# Patient Record
Sex: FEMALE | Race: WHITE | NOT HISPANIC OR LATINO | URBAN - METROPOLITAN AREA
[De-identification: names, ages, dates, MRNs, and addresses within clinical notes are randomized per-mention and may not be internally consistent; named-entity substitution may affect disease eponyms.]

---

## 2018-01-25 ENCOUNTER — OTHER (OUTPATIENT)
Dept: URBAN - METROPOLITAN AREA CLINIC 19 | Facility: CLINIC | Age: 63
Setting detail: DERMATOLOGY
End: 2018-01-25

## 2018-01-25 PROCEDURE — OTHER VBEAM LASER: OTHER

## 2018-02-22 ENCOUNTER — LASER (OUTPATIENT)
Dept: URBAN - METROPOLITAN AREA CLINIC 19 | Facility: CLINIC | Age: 63
Setting detail: DERMATOLOGY
End: 2018-02-22

## 2018-02-22 DIAGNOSIS — L70.0 ACNE VULGARIS: ICD-10-CM

## 2018-02-22 PROCEDURE — OTHER VBEAM LASER: OTHER

## 2018-02-22 PROCEDURE — OTHER SCLEROTHERAPY: OTHER

## 2018-03-22 ENCOUNTER — LASER (OUTPATIENT)
Dept: URBAN - METROPOLITAN AREA CLINIC 19 | Facility: CLINIC | Age: 63
Setting detail: DERMATOLOGY
End: 2018-03-22

## 2018-03-22 DIAGNOSIS — C44.612 BASAL CELL CARCINOMA OF SKIN OF RIGHT UPPER LIMB, INCLUDING SHOULDER: ICD-10-CM

## 2018-03-22 PROCEDURE — OTHER NO CHARGE PER PROVIDER: OTHER

## 2018-05-17 ENCOUNTER — LASER (OUTPATIENT)
Dept: URBAN - METROPOLITAN AREA CLINIC 19 | Facility: CLINIC | Age: 63
Setting detail: DERMATOLOGY
End: 2018-05-17

## 2018-05-17 DIAGNOSIS — B35.1 TINEA UNGUIUM: ICD-10-CM

## 2018-05-17 DIAGNOSIS — L30.9 DERMATITIS, UNSPECIFIED: ICD-10-CM

## 2018-05-17 PROCEDURE — OTHER C02RE LASER: OTHER

## 2019-04-03 ENCOUNTER — LASER (OUTPATIENT)
Dept: URBAN - METROPOLITAN AREA CLINIC 19 | Facility: CLINIC | Age: 64
Setting detail: DERMATOLOGY
End: 2019-04-03

## 2019-04-03 DIAGNOSIS — L82.1 OTHER SEBORRHEIC KERATOSIS: ICD-10-CM

## 2019-04-03 DIAGNOSIS — Z85.828 PERSONAL HISTORY OF OTHER MALIGNANT NEOPLASM OF SKIN: ICD-10-CM

## 2019-11-08 ENCOUNTER — OTHER (OUTPATIENT)
Dept: URBAN - METROPOLITAN AREA CLINIC 19 | Facility: CLINIC | Age: 64
Setting detail: DERMATOLOGY
End: 2019-11-08

## 2020-09-04 RX ORDER — PREDNISONE 10 MG/1
10 MG TABLET ORAL AS DIRECTED
Qty: 35 | Refills: 0
Start: 2020-09-04

## 2020-09-04 RX ORDER — DOXYCYCLINE HYCLATE 200 MG/1
1/2 TABLET TABLET, DELAYED RELEASE ORAL ONCE A DAY
Qty: 1 | Refills: 0
Start: 2020-09-04

## 2020-11-16 ENCOUNTER — COMPLETE SKIN EXAM (OUTPATIENT)
Dept: URBAN - METROPOLITAN AREA CLINIC 19 | Facility: CLINIC | Age: 65
Setting detail: DERMATOLOGY
End: 2020-11-16

## 2020-11-16 DIAGNOSIS — D48.5 NEOPLASM OF UNCERTAIN BEHAVIOR OF SKIN: ICD-10-CM

## 2020-11-16 PROBLEM — L82.1 OTHER SEBORRHEIC KERATOSIS: Status: RESOLVED | Noted: 2020-11-16

## 2020-11-16 PROBLEM — L82.0 INFLAMED SEBORRHEIC KERATOSIS: Status: RESOLVED | Noted: 2020-11-16

## 2020-11-16 PROBLEM — D18.01 HEMANGIOMA OF SKIN AND SUBCUTANEOUS TISSUE: Status: RESOLVED | Noted: 2020-11-16

## 2020-11-16 PROCEDURE — 11102 TANGNTL BX SKIN SINGLE LES: CPT

## 2020-11-16 PROCEDURE — 99213 OFFICE O/P EST LOW 20 MIN: CPT

## 2020-11-16 PROCEDURE — 17110 DESTRUCT B9 LESION 1-14: CPT

## 2020-11-17 ENCOUNTER — LASER (OUTPATIENT)
Dept: URBAN - METROPOLITAN AREA CLINIC 19 | Facility: CLINIC | Age: 65
Setting detail: DERMATOLOGY
End: 2020-11-17

## 2020-11-17 DIAGNOSIS — L57.0 ACTINIC KERATOSIS: ICD-10-CM

## 2020-11-17 PROCEDURE — OTHER GENTLEMAX LASER: OTHER

## 2021-05-21 ENCOUNTER — OFFICE (OUTPATIENT)
Dept: URBAN - METROPOLITAN AREA CLINIC 72 | Facility: CLINIC | Age: 66
End: 2021-05-21
Payer: OTHER GOVERNMENT

## 2021-05-21 VITALS
HEIGHT: 63 IN | HEART RATE: 85 BPM | WEIGHT: 118 LBS | DIASTOLIC BLOOD PRESSURE: 83 MMHG | SYSTOLIC BLOOD PRESSURE: 119 MMHG

## 2021-05-21 DIAGNOSIS — K22.70 BARRETT'S ESOPHAGUS WITHOUT DYSPLASIA: ICD-10-CM

## 2021-05-21 DIAGNOSIS — D49.0 NEOPLASM OF UNSPECIFIED BEHAVIOR OF DIGESTIVE SYSTEM: ICD-10-CM

## 2021-05-21 DIAGNOSIS — Z12.11 ENCOUNTER FOR SCREENING FOR MALIGNANT NEOPLASM OF COLON: ICD-10-CM

## 2021-05-21 DIAGNOSIS — K21.9 GASTRO-ESOPHAGEAL REFLUX DISEASE WITHOUT ESOPHAGITIS: ICD-10-CM

## 2021-05-21 PROCEDURE — 99213 OFFICE O/P EST LOW 20 MIN: CPT | Performed by: INTERNAL MEDICINE

## 2021-05-21 RX ORDER — FAMOTIDINE 40 MG/1
TABLET, FILM COATED ORAL
Qty: 90 | Refills: 2 | Status: ACTIVE

## 2021-05-21 RX ORDER — OMEPRAZOLE 20 MG/1
CAPSULE, DELAYED RELEASE ORAL
Qty: 30 | Refills: 10 | Status: ACTIVE

## 2021-05-21 NOTE — SERVICENOTES
Our goal is to partner with you to improve your health and well being. It is important for you to complete necessary testing and follow the instructions given to you at your clinic visit. Our office will call you within 2 weeks with results of any testing but you may also call sooner to obtain results [phone].   If you have any questions or concerns please feel free to call.  We take your care very seriously and we thank you for your trust!
- famotidine at night before bed or after dinner as needed for breakthrough
- you can also use Gaviscon advanced for breakthrough
- EGD in 5/2022.  WE will wait list you
- follow up as needed.

## 2021-05-21 NOTE — SERVICEHPINOTES
Kenia Pandey   is seen today for a follow-up visit.     65 year old with a history of breast cancer, osteopenia, history of partial papncreatectomy for pancreatic cysts (ho IPMN status post modified whipple in 2012).  She as previously seen by Dr. Johnson.  she had possible barretts on EGD 7/2017 and is due to repeat in 1 year.  She is taking omeprazole daily.  She was started on omeprazole by Dr. Johnson because she had occasional reflux symptoms.  Symptoms occur if she eats too  much late at night or if she bends over.  They are intermittent.  She is not sure if the omeprazole has really helped.  SHe is taking calcium.  She has osteopenia but is on medication and bones are back to normal.  No dysphagia.  NO weight loss.BRInterval history 11/2019BRShe is feeling good.BRShe takes her omeprazole once a day. That pretty much helps her symptoms .  She has some occasional regurgitation.BRNo side effects from the medication.  She is taking the 20 mg dose of the medication.  BRBowel movements are OK, no other symptoms.  She takes fiber every gone.    Plan from last visit:BR- will repeat EGD around 7/2021BR- continue omeprazole 20 mg once daily 30 min before breakfastBR- Follow up as needed if symptoms are not improving or if you are having new symptomsInterval History:  5/21/2021  she is due for the EGDBRshe needs refill on the omeprazoleBREver since she has had her whipple she has an intermittent tightening in her upper abdomen.  This can occur about every couple months.  It feels like a tightening of the abdominal wall muscles and if she takes deeps breaths it will go away.  It may last 2-3 minutes. On the 20 of omeprazole sometimes will have night time symptoms when laying.  My nurse has reviewed and updated the medication list with the patient (medication reconciliation). I have also reviewed the medication list. New updates were made to the patient's medical, social and family history. Pertinent details are also noted above in the HPI.BR

## 2021-11-11 ENCOUNTER — LASER (OUTPATIENT)
Dept: URBAN - METROPOLITAN AREA CLINIC 19 | Facility: CLINIC | Age: 66
Setting detail: DERMATOLOGY
End: 2021-11-11

## 2021-11-11 DIAGNOSIS — D18.01 HEMANGIOMA OF SKIN AND SUBCUTANEOUS TISSUE: ICD-10-CM

## 2021-11-11 DIAGNOSIS — Z85.828 PERSONAL HISTORY OF OTHER MALIGNANT NEOPLASM OF SKIN: ICD-10-CM

## 2021-11-11 DIAGNOSIS — L90.5 SCAR CONDITIONS AND FIBROSIS OF SKIN: ICD-10-CM

## 2021-11-11 DIAGNOSIS — L57.8 OTHER SKIN CHANGES DUE TO CHRONIC EXPOSURE TO NONIONIZING RADIATION: ICD-10-CM

## 2021-11-11 DIAGNOSIS — L82.1 OTHER SEBORRHEIC KERATOSIS: ICD-10-CM

## 2021-11-11 DIAGNOSIS — L81.4 OTHER MELANIN HYPERPIGMENTATION: ICD-10-CM

## 2021-11-11 PROCEDURE — OTHER VBEAM LASER: OTHER

## 2021-11-11 PROCEDURE — OTHER HMG - COSMETIC PROCEDURE: OTHER

## 2022-05-23 ENCOUNTER — OFFICE (OUTPATIENT)
Dept: URBAN - METROPOLITAN AREA CLINIC 72 | Facility: CLINIC | Age: 67
End: 2022-05-23
Payer: OTHER GOVERNMENT

## 2022-05-23 VITALS
HEART RATE: 80 BPM | HEIGHT: 63 IN | DIASTOLIC BLOOD PRESSURE: 80 MMHG | SYSTOLIC BLOOD PRESSURE: 138 MMHG | WEIGHT: 112 LBS | OXYGEN SATURATION: 99 %

## 2022-05-23 DIAGNOSIS — K22.70 BARRETT'S ESOPHAGUS WITHOUT DYSPLASIA: ICD-10-CM

## 2022-05-23 DIAGNOSIS — M62.838 OTHER MUSCLE SPASM: ICD-10-CM

## 2022-05-23 DIAGNOSIS — K21.9 GASTRO-ESOPHAGEAL REFLUX DISEASE WITHOUT ESOPHAGITIS: ICD-10-CM

## 2022-05-23 PROCEDURE — 99214 OFFICE O/P EST MOD 30 MIN: CPT | Performed by: INTERNAL MEDICINE

## 2022-05-23 NOTE — SERVICEHPINOTES
Kenia Pandey   is seen today for a follow-up visit.  
br
br66 year old with a history of breast cancer, osteopenia, history of partial papncreatectomy for pancreatic cysts (ho IPMN status post modified whipple in 2012). She as previously seen by Dr. Johnson. she had possible barretts on EGD 7/2017 and is due to repeat in 1 year. She is taking omeprazole daily. She was started on omeprazole by Dr. Johnson because she had occasional reflux symptoms. Symptoms occur if she eats too much late at night or if she bends over. They are intermittent. She is not sure if the omeprazole has really helped. SHe is taking calcium. She has osteopenia but is on medication and bones are back to normal. No dysphagia. NO weight loss.brInterval history 11/2019brJayne is feeling good.Franciscohe takes her omeprazole once a day. That pretty much helps her symptoms. She has some occasional regurgitation.brNo side effects from the medication. She is taking the 20 mg dose of the medication. brBowel movements are OK, no other symptoms. She takes fiber every gone. Interval History:5/21/2021she is due for the EGDbrshe needs refill on the omeprazolebrEver since she has had her whipple she has an intermittent tightening in her upper abdomen. This can occur about every couple months. It feels like a tightening of the abdominal wall muscles and if she takes deeps breaths it will go away. It may last 2-3 minutes.On the 20 of omeprazole sometimes will have night time symptoms when laying.    br  Plan from last visit:
br
br- famotidine at night before bed or after dinner as needed for breakthroughbr- you can also use Gaviscon advanced for breakthroughbr- EGD in 5/2022. WE will wait list youbr- follow up as needed. Interval History:  5/23/2022   
br   she is due for EGD now
br She still has a muscle spasm in her upper abdomen inly lasting for 15 seconds to a ocuple minutesMy nurse has reviewed and updated the medication list with the patient (medication reconciliation). I have also reviewed the medication list. New updates were made to the patient's medical, social and family history. Pertinent details are also noted above in the HPI.br visited="true"

## 2022-05-23 NOTE — SERVICENOTES
Our goal is to partner with you to improve your health and well being. It is important for you to complete necessary testing and follow the instructions given to you at your clinic visit. Our office will call you within 2 weeks with results of any testing but you may also call sooner to obtain results [phone].   If you have any questions or concerns please feel free to call.  We take your care very seriously and we thank you for your trust!
- , start hyoscyamine/levsin 1 pill sublingual up to every 8 hours as needed for abdominal cramping or pain.  If this doesn't help then stop
- schedule EGD

## 2022-06-22 ENCOUNTER — APPOINTMENT (OUTPATIENT)
Dept: URBAN - METROPOLITAN AREA SURGERY 12 | Age: 67
Setting detail: DERMATOLOGY
End: 2022-06-22

## 2022-06-22 DIAGNOSIS — Z41.9 ENCOUNTER FOR PROCEDURE FOR PURPOSES OTHER THAN REMEDYING HEALTH STATE, UNSPECIFIED: ICD-10-CM

## 2022-06-22 PROCEDURE — OTHER LASER BROWN SPOTS: OTHER

## 2022-06-22 PROCEDURE — OTHER VBEAM PERFECTA LASER: OTHER

## 2022-06-22 ASSESSMENT — LOCATION SIMPLE DESCRIPTION DERM: LOCATION SIMPLE: LEFT CHEEK

## 2022-06-22 ASSESSMENT — LOCATION DETAILED DESCRIPTION DERM: LOCATION DETAILED: LEFT INFERIOR CENTRAL MALAR CHEEK

## 2022-06-22 ASSESSMENT — LOCATION ZONE DERM: LOCATION ZONE: FACE

## 2022-06-22 NOTE — PROCEDURE: LASER BROWN SPOTS
Post-Care Instructions: I reviewed with the patient in detail post-care instructions. Patient is to apply vaseline as needed to all crusted areas, and avoid picking at any scabs. Pt should stay away from the sun and wear sun protection until fully healed.

## 2022-06-22 NOTE — PROCEDURE: VBEAM PERFECTA LASER
Pulse Duration: 10 ms
Pulse Duration: 40 ms
Is There A Third Setting?: yes
Spray Time (Ms): 40
Number Of Pulses: 309
Post-Procedure: Following the procedure the post care instructions were reviewed with the patient.
Number Of Pulses: 31
Fluence (J/Cm2): 7.25
Spot Size: 3 mm
Spot Size: 7 mm
Spray Time (Ms): 0
Delay Time (Ms): 30
Price (Use Numbers Only, No Special Characters Or $): 100.00
Skin Type (Optional): II
Pulse Duration: 1.5 ms
Fluence (J/Cm2): 5.5
Spot Size: 12 mm
Consent: Verbal consent obtained.  Risks were reviewed including but not limited to crusting, scabbing, blistering, scarring, darker or lighter pigmentary change, bruising, and/or incomplete response.
Treatment Number: 1
Is There A Fourth Setting?: no
Fluence (J/Cm2): 11.5
Spot Size: 3x10 mm
Post-Care Instructions: I reviewed with the patient in detail post-care instructions.  Cool compresses or cold gel packs may be applied after treatment.  Exposure to the sun should be avoided and sun protection and sunscreen should be used.
Delay Time (Ms): 20
Location: face
Number Of Pulses: 266
Pre-Procedure: The treatment areas identified by the patient were cleansed and treatment was performed with the parameters mentioned above.
Detail Level: Zone

## 2022-07-18 ENCOUNTER — OFFICE (OUTPATIENT)
Dept: URBAN - METROPOLITAN AREA PATHOLOGY 24 | Facility: PATHOLOGY | Age: 67
End: 2022-07-18
Payer: OTHER GOVERNMENT

## 2022-07-18 ENCOUNTER — AMBULATORY SURGICAL CENTER (OUTPATIENT)
Dept: URBAN - METROPOLITAN AREA SURGERY 19 | Facility: SURGERY | Age: 67
End: 2022-07-18
Payer: OTHER GOVERNMENT

## 2022-07-18 DIAGNOSIS — K22.89 OTHER SPECIFIED DISEASE OF ESOPHAGUS: ICD-10-CM

## 2022-07-18 DIAGNOSIS — K22.2 ESOPHAGEAL OBSTRUCTION: ICD-10-CM

## 2022-07-18 LAB
RELEVANT H&P ENDOSCOPY: (no result)
RELEVANT H&P ENDOSCOPY: (no result)

## 2022-07-18 PROCEDURE — 88305 TISSUE EXAM BY PATHOLOGIST: CPT | Performed by: STUDENT IN AN ORGANIZED HEALTH CARE EDUCATION/TRAINING PROGRAM

## 2022-07-18 PROCEDURE — 88313 SPECIAL STAINS GROUP 2: CPT | Performed by: STUDENT IN AN ORGANIZED HEALTH CARE EDUCATION/TRAINING PROGRAM

## 2022-07-18 PROCEDURE — 43239 EGD BIOPSY SINGLE/MULTIPLE: CPT | Performed by: INTERNAL MEDICINE

## 2023-09-05 ENCOUNTER — OFFICE (OUTPATIENT)
Dept: URBAN - METROPOLITAN AREA CLINIC 72 | Facility: CLINIC | Age: 68
End: 2023-09-05
Payer: OTHER GOVERNMENT

## 2023-09-05 VITALS
OXYGEN SATURATION: 94 % | SYSTOLIC BLOOD PRESSURE: 112 MMHG | WEIGHT: 125 LBS | DIASTOLIC BLOOD PRESSURE: 70 MMHG | HEART RATE: 75 BPM | HEIGHT: 63 IN

## 2023-09-05 DIAGNOSIS — Z90.410 ACQUIRED TOTAL ABSENCE OF PANCREAS: ICD-10-CM

## 2023-09-05 DIAGNOSIS — K21.9 GASTRO-ESOPHAGEAL REFLUX DISEASE WITHOUT ESOPHAGITIS: ICD-10-CM

## 2023-09-05 DIAGNOSIS — Z79.899 OTHER LONG TERM (CURRENT) DRUG THERAPY: ICD-10-CM

## 2023-09-05 PROCEDURE — 99213 OFFICE O/P EST LOW 20 MIN: CPT | Performed by: INTERNAL MEDICINE

## 2023-09-05 NOTE — SERVICENOTES
Our goal is to partner with you to improve your health and well being. It is important for you to complete necessary testing and follow the instructions given to you at your clinic visit. Our office will call you within 2 weeks with results of any testing but you may also call sooner to obtain results [phone].   If you have any questions or concerns please feel free to call.  We take your care very seriously and we thank you for your trust!
- labs
- continue current meds
- follow up in a year

## 2023-09-05 NOTE — SERVICEHPINOTES
Kenia Pandey   is seen today for a follow-up visit.  
br
br67  year old with a history of breast cancer, osteopenia, history of partial papncreatectomy for pancreatic cysts (ho IPMN status post modified whipple in 2012). She as previously seen by Dr. Johnson.she had possible barretts on EGD 7/2017 and is due to repeat in 1 year. She is taking omeprazole daily. She was started on omeprazole by Dr. Johnson because she had occasional reflux symptoms. Symptoms occur if she eats too much late at night or if she bends over. They are intermittent. She is not sure if the omeprazole has really helped. SHe is taking calcium. She has osteopenia but is on medication and bones are back to normal. No dysphagia. NO weight loss.brInterval history 11/2019brJayne is feeling good.Luisa takes her omeprazole once a day. That pretty much helps her symptoms. She has some occasional regurgitation.brNo side effects from the medication. She is taking the 20 mg dose of the medication.brBowel movements are OK, no other symptoms. She takes fiber every gone.Interval History:5/21/2021she is due for the EGDbrshe needs refill on the omeprazolebrEver since she has had her whipple she has an intermittent tightening in her upper abdomen. This can occur about every couple months. It feels like a tightening of the abdominal wall muscles and if she takes deeps breaths it will go away. It may last 2-3 minutes.On the 20 of omeprazole sometimes will have night time symptoms when laying.brInterval History:5/23/2022brshe is due for EGD nowLuisa still has a muscle spasm in her upper abdomen inly lasting for 15 seconds to a ocuple minutes    br  Plan from last visit:
br- , start hyoscyamine/levsin 1 pill sublingual up to every 8 hours as needed for abdominal cramping or pain. If this doesn't help then stopbr- schedule EGD Interval History:  9/5/2023   
br   EGD with pylorus sparing whipple anatomy, hiatal hernia, schatzki ring 
br Overall she is doing well. NO trouble swallowing. 
br She is on famotidine at night and omeprazole 20 in the AM.  My nurse has reviewed and updated the medication list with the patient (medication reconciliation). I have also reviewed the medication list. New updates were made to the patient's medical, social and family history. Pertinent details are also noted above in the HPI.br visited="true" Rash

## 2024-02-27 ENCOUNTER — APPOINTMENT (OUTPATIENT)
Dept: URBAN - METROPOLITAN AREA SURGERY 12 | Age: 69
Setting detail: DERMATOLOGY
End: 2024-02-27

## 2024-02-27 DIAGNOSIS — Z41.9 ENCOUNTER FOR PROCEDURE FOR PURPOSES OTHER THAN REMEDYING HEALTH STATE, UNSPECIFIED: ICD-10-CM

## 2024-02-27 PROCEDURE — OTHER GENTLEMAX: OTHER

## 2024-02-27 PROCEDURE — OTHER VBEAM PERFECTA LASER: OTHER

## 2024-02-27 ASSESSMENT — LOCATION SIMPLE DESCRIPTION DERM
LOCATION SIMPLE: LEFT CHEEK
LOCATION SIMPLE: CHEST

## 2024-02-27 ASSESSMENT — LOCATION ZONE DERM
LOCATION ZONE: FACE
LOCATION ZONE: TRUNK

## 2024-02-27 ASSESSMENT — LOCATION DETAILED DESCRIPTION DERM
LOCATION DETAILED: LEFT INFERIOR CENTRAL MALAR CHEEK
LOCATION DETAILED: UPPER STERNUM

## 2024-02-27 NOTE — PROCEDURE: VBEAM PERFECTA LASER
Pulse Duration: 10 ms
Pulse Duration: 40 ms
Is There A Third Setting?: no
Spray Time (Ms): 40
Number Of Pulses: 285
Post-Procedure: Following the procedure the post care instructions were reviewed with the patient.
Number Of Pulses: 31
Fluence (J/Cm2): 11.25
Spot Size: 3 mm
Spot Size: 3x10 mm
Spray Time (Ms): 0
Delay Time (Ms): 30
Price (Use Numbers Only, No Special Characters Or $): 100.00
Skin Type (Optional): II
Pulse Duration: 1.5 ms
Fluence (J/Cm2): 5.25
Spot Size: 12 mm
Consent: Verbal consent obtained.  Risks were reviewed including but not limited to crusting, scabbing, blistering, scarring, darker or lighter pigmentary change, bruising, and/or incomplete response.
Treatment Number: 2
Fluence (J/Cm2): 11.5
Post-Care Instructions: I reviewed with the patient in detail post-care instructions.  Cool compresses or cold gel packs may be applied after treatment.  Exposure to the sun should be avoided and sun protection and sunscreen should be used.
Delay Time (Ms): 20
Location: face
Is There A Second Setting?: yes
Number Of Pulses: 56
Pre-Procedure: The treatment areas identified by the patient were cleansed and treatment was performed with the parameters mentioned above.
Detail Level: Zone

## 2024-02-27 NOTE — PROCEDURE: GENTLEMAX
Consent: Verbal consent obtained, risks reviewed including but not limited to crusting, scabbing, blistering, scarring, darker or lighter pigmentary change, paradoxical hair regrowth, incomplete removal of hair and infection.
Total Pulses: 41
Pulse Duration: 3 ms
Pain Scale Out Of 10: 1
Endpoint: Post care reviewed with patient.
Price (Use Numbers Only, No Special Characters Or $): 100.00
Spot Size: 10 mm
Fluence: 28
Post-Care Instructions: I reviewed with the patient in detail post-care instructions. Patient should avoid sun for a minimum of 4 weeks before and after treatment.
Detail Level: Zone

## 2024-09-23 ENCOUNTER — APPOINTMENT (OUTPATIENT)
Dept: URBAN - METROPOLITAN AREA SURGERY 12 | Age: 69
Setting detail: DERMATOLOGY
End: 2024-09-23

## 2024-09-23 DIAGNOSIS — Z41.9 ENCOUNTER FOR PROCEDURE FOR PURPOSES OTHER THAN REMEDYING HEALTH STATE, UNSPECIFIED: ICD-10-CM

## 2024-09-23 PROCEDURE — OTHER VBEAM PERFECTA LASER: OTHER

## 2024-09-23 PROCEDURE — OTHER BENIGN DESTRUCTION COSMETIC: OTHER

## 2024-09-23 ASSESSMENT — LOCATION SIMPLE DESCRIPTION DERM
LOCATION SIMPLE: LEFT FOREHEAD
LOCATION SIMPLE: CHEST
LOCATION SIMPLE: LEFT CHEEK

## 2024-09-23 ASSESSMENT — LOCATION DETAILED DESCRIPTION DERM
LOCATION DETAILED: LEFT FOREHEAD
LOCATION DETAILED: LEFT INFERIOR LATERAL MALAR CHEEK
LOCATION DETAILED: MIDDLE STERNUM

## 2024-09-23 ASSESSMENT — LOCATION ZONE DERM
LOCATION ZONE: FACE
LOCATION ZONE: TRUNK

## 2024-09-23 NOTE — PROCEDURE: BENIGN DESTRUCTION COSMETIC
Price (Use Numbers Only, No Special Characters Or $): 50.00
Post-Care Instructions: I reviewed with the patient in detail post-care instructions. Patient is to wear sunprotection, and avoid picking at any of the treated lesions. Pt may apply Vaseline to crusted or scabbing areas.
Consent: The patient's consent was obtained including but not limited to risks of crusting, scabbing, blistering, scarring, darker or lighter pigmentary change, recurrence, incomplete removal and infection.
Detail Level: Zone

## 2024-09-23 NOTE — PROCEDURE: VBEAM PERFECTA LASER
Pulse Duration: 10 ms
Is There A Third Setting?: no
Spray Time (Ms): 40
Number Of Pulses: 343
Post-Procedure: Following the procedure the post care instructions were reviewed with the patient.
Number Of Pulses: 31
Fluence (J/Cm2): 11.25
Spot Size: 3 mm
Spot Size: 3x10 mm
Spray Time (Ms): 0
Delay Time (Ms): 30
Price (Use Numbers Only, No Special Characters Or $): 100.00
Skin Type (Optional): II
Pulse Duration: 1.5 ms
Fluence (J/Cm2): 5.5
Spot Size: 10 mm
Consent: Verbal consent obtained.  Risks were reviewed including but not limited to crusting, scabbing, blistering, scarring, darker or lighter pigmentary change, bruising, and/or incomplete response.
Treatment Number: 3
Fluence (J/Cm2): 11.5
Post-Care Instructions: I reviewed with the patient in detail post-care instructions.  Cool compresses or cold gel packs may be applied after treatment.  Exposure to the sun should be avoided and sun protection and sunscreen should be used.
Delay Time (Ms): 20
Location: face
Is There A Second Setting?: yes
Number Of Pulses: 52
Pre-Procedure: The treatment areas identified by the patient were cleansed and treatment was performed with the parameters mentioned above.
Detail Level: Zone

## 2025-04-16 ENCOUNTER — APPOINTMENT (OUTPATIENT)
Dept: URBAN - METROPOLITAN AREA SURGERY 12 | Age: 70
Setting detail: DERMATOLOGY
End: 2025-04-16

## 2025-04-16 DIAGNOSIS — Z41.9 ENCOUNTER FOR PROCEDURE FOR PURPOSES OTHER THAN REMEDYING HEALTH STATE, UNSPECIFIED: ICD-10-CM

## 2025-04-16 PROCEDURE — OTHER VBEAM PERFECTA LASER: OTHER

## 2025-04-16 PROCEDURE — OTHER GENTLEMAX: OTHER

## 2025-04-16 ASSESSMENT — LOCATION DETAILED DESCRIPTION DERM
LOCATION DETAILED: RIGHT ANTERIOR DISTAL THIGH
LOCATION DETAILED: LEFT ANTERIOR PROXIMAL THIGH
LOCATION DETAILED: LEFT PROXIMAL CALF
LOCATION DETAILED: RIGHT PROXIMAL CALF
LOCATION DETAILED: RIGHT POPLITEAL SKIN
LOCATION DETAILED: LEFT DISTAL MEDIAL CALF

## 2025-04-16 ASSESSMENT — LOCATION SIMPLE DESCRIPTION DERM
LOCATION SIMPLE: LEFT CALF
LOCATION SIMPLE: RIGHT POPLITEAL SKIN
LOCATION SIMPLE: RIGHT THIGH
LOCATION SIMPLE: LEFT THIGH
LOCATION SIMPLE: RIGHT CALF

## 2025-04-16 ASSESSMENT — LOCATION ZONE DERM: LOCATION ZONE: LEG

## 2025-04-16 NOTE — PROCEDURE: GENTLEMAX
Pain Scale Out Of 10: 1
Endpoint: Post care reviewed with patient.
Consent: Verbal consent obtained, risks reviewed including but not limited to crusting, scabbing, blistering, scarring, darker or lighter pigmentary change, paradoxical hair regrowth, incomplete removal of hair and infection.
Price (Use Numbers Only, No Special Characters Or $): 50.00
Pulse Duration: 3 ms
Total Pulses: 51
Fluence: 28
Spot Size: 10 mm
Post-Care Instructions: I reviewed with the patient in detail post-care instructions. Patient should avoid sun for a minimum of 4 weeks before and after treatment.
Detail Level: Zone

## 2025-04-16 NOTE — PROCEDURE: VBEAM PERFECTA LASER
Pulse Duration: 1.5 ms
Pulse Duration: 10 ms
Is There A Third Setting?: yes
Spray Time (Ms): 40
Number Of Pulses: 280
Post-Procedure: Following the procedure the post care instructions were reviewed with the patient.
Number Of Pulses: 3
Fluence (J/Cm2): 11.25
Spot Size: 3 mm
Spot Size: 3x10 mm
Spray Time (Ms): 0
Delay Time (Ms): 30
Price (Use Numbers Only, No Special Characters Or $): 100.00
Skin Type (Optional): II
Fluence (J/Cm2): 5.5
Spot Size: 10 mm
Consent: Verbal consent obtained.  Risks were reviewed including but not limited to crusting, scabbing, blistering, scarring, darker or lighter pigmentary change, bruising, and/or incomplete response.
Treatment Number: 4
Is There A Fourth Setting?: no
Fluence (J/Cm2): 7.5
Spot Size: 7 mm
Post-Care Instructions: I reviewed with the patient in detail post-care instructions.  Cool compresses or cold gel packs may be applied after treatment.  Exposure to the sun should be avoided and sun protection and sunscreen should be used.
Delay Time (Ms): 20
Location: face
Number Of Pulses: 39
Pre-Procedure: The treatment areas identified by the patient were cleansed and treatment was performed with the parameters mentioned above.
Detail Level: Zone